# Patient Record
Sex: FEMALE
[De-identification: names, ages, dates, MRNs, and addresses within clinical notes are randomized per-mention and may not be internally consistent; named-entity substitution may affect disease eponyms.]

---

## 2018-12-04 ENCOUNTER — HOSPITAL ENCOUNTER (EMERGENCY)
Dept: HOSPITAL 14 - H.ER | Age: 43
Discharge: HOME | End: 2018-12-04
Payer: COMMERCIAL

## 2018-12-04 VITALS — TEMPERATURE: 100.4 F | RESPIRATION RATE: 18 BRPM

## 2018-12-04 VITALS — DIASTOLIC BLOOD PRESSURE: 72 MMHG | HEART RATE: 114 BPM | SYSTOLIC BLOOD PRESSURE: 118 MMHG

## 2018-12-04 VITALS — OXYGEN SATURATION: 95 %

## 2018-12-04 DIAGNOSIS — J02.0: Primary | ICD-10-CM

## 2018-12-04 DIAGNOSIS — Z88.0: ICD-10-CM

## 2018-12-04 NOTE — ED PDOC
HPI: Influenza


Time Seen by Provider: 12/04/18 12:54


Chief Complaint: Flu-like Symptoms


Chief Complaint (Provider): Flu-like Symptoms


History Per: Patient


Exam Limitations: no limitations


Symptoms include: fever (tactile), sore throat, nasal congestion, other (body 

aches)


Additional complaint(s):: 





Glenn Smith is a 43 year old female with a past medical history of gastritis, 

who presents to the emergency department complaining of having flu-like 

symptoms. Patient states that for the past x2 days she has been having tactile 

fevers, mild sore throat, congestion, and body aches. Patient reports that she 

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 

her a strep test yesterday. The strep test was found to be negative and no 

culture was sent. 





PMD: No provider 





Past Medical History


Reviewed: Historical Data, Nursing Documentation, Vital Signs


Vital Signs: 


                                Last Vital Signs











Temp  100.4 F H  12/04/18 12:37


 


Pulse  137 H  12/04/18 12:37


 


Resp  18   12/04/18 12:37


 


BP  134/91 H  12/04/18 12:37


 


Pulse Ox  95   12/04/18 12:37














- Medical History


PMH: No Chronic Diseases, Gastritis





- Surgical History


Surgical History: No Surg Hx





- Family History


Family History: States: Unknown Family Hx





- Home Medications


Home Medications: 


                                Ambulatory Orders











 Medication  Instructions  Recorded


 


Clindamycin [Cleocin] 300 mg PO Q8H #30 cap 03/21/16


 


Azithromycin [Zithromax] 500 mg PO DAILY #5 tablet 12/04/18














- Allergies


Allergies/Adverse Reactions: 


                                    Allergies











Allergy/AdvReac Type Severity Reaction Status Date / Time


 


Penicillins Allergy  ANAPHYLAXIS Verified 12/04/18 12:37














Review of Systems


ROS Statement: Except As Marked, All Systems Reviewed And Found Negative


Constitutional: Positive for: Fever (tactile), Other (body aches)


ENT: Positive for: Nose Congestion, Throat Pain





Physical Exam





- Reviewed


Nursing Documentation Reviewed: Yes


Vital Signs Reviewed: Yes





- Physical Exam


Appears: Positive for: Non-toxic, No Acute Distress


Head Exam: Positive for: ATRAUMATIC, NORMOCEPHALIC


Skin: Positive for: Normal Color


ENT: Positive for: Pharyngeal Erythema.  Negative for: Tonsillar Exudate, 

Tonsillar Swelling


Cardiovascular/Chest: Positive for: Tachycardia


Respiratory: Negative for: Respiratory Distress


Neurologic/Psych: Positive for: Alert, Oriented (x3)





Medical Decision Making


Medical Decision Making: 





Time: 13:06


Plan:


--Rapid strep group A antigen 


--Rapid flu A/B influenza


--Tylenol 975 mg PO


--Urine pregnancy 





Patient was noted to be tachycardic and with low grade fever; tylenol po given. 

 Pt. feeling better, tolerating po.  Repeat hr 92, no distress.  Strep positive,

 rx zithro given as pt. is allergic to pcn.





--------------------------------------

-----------------------------------------------------------


Scribe Attestation:


Documented by Jeevan Chaudhry, acting as a scribe for Sharee Miranda PA-C.





Provider Scribe Attestation:


All medical record entries made by the Scribe were at my direction and 

personally dictated by me. I have reviewed the chart and agree that the record 

accurately reflects my personal performance of the history, physical exam, 

medical decision making, and the department course for this patient. I have also

 personally directed, reviewed, and agree with the discharge instructions and 

disposition.





- ECG


O2 Sat by Pulse Oximetry: 95





Disposition





- Clinical Impression


Clinical Impression: 


 Strep pharyngitis








- Patient ED Disposition


Is Patient to be Admitted: No


Counseled Patient/Family Regarding: Studies Performed, Diagnosis, Need For Foll

owup





- Disposition


Referrals: 


[facility] [Outside]


Disposition: Routine/Home


Disposition Time: 15:25


Condition: IMPROVED


Prescriptions: 


Azithromycin [Zithromax] 500 mg PO DAILY #5 tablet


Instructions:  Strep Throat (DC)


Forms:  [facility] (English), [facility] ED School/Work Excuse